# Patient Record
Sex: FEMALE | Race: AMERICAN INDIAN OR ALASKA NATIVE | NOT HISPANIC OR LATINO
[De-identification: names, ages, dates, MRNs, and addresses within clinical notes are randomized per-mention and may not be internally consistent; named-entity substitution may affect disease eponyms.]

---

## 2018-07-13 PROBLEM — Z00.00 ENCOUNTER FOR PREVENTIVE HEALTH EXAMINATION: Status: ACTIVE | Noted: 2018-07-13

## 2020-09-14 ENCOUNTER — TRANSCRIPTION ENCOUNTER (OUTPATIENT)
Age: 25
End: 2020-09-14

## 2023-02-07 ENCOUNTER — APPOINTMENT (OUTPATIENT)
Dept: OBGYN | Facility: CLINIC | Age: 28
End: 2023-02-07
Payer: MEDICAID

## 2023-02-07 PROCEDURE — 76817 TRANSVAGINAL US OBSTETRIC: CPT

## 2023-02-07 PROCEDURE — 36415 COLL VENOUS BLD VENIPUNCTURE: CPT

## 2023-02-07 PROCEDURE — 81025 URINE PREGNANCY TEST: CPT

## 2023-02-07 PROCEDURE — 99385 PREV VISIT NEW AGE 18-39: CPT

## 2023-02-07 PROCEDURE — 81002 URINALYSIS NONAUTO W/O SCOPE: CPT

## 2023-03-09 ENCOUNTER — APPOINTMENT (OUTPATIENT)
Dept: OBGYN | Facility: CLINIC | Age: 28
End: 2023-03-09
Payer: MEDICAID

## 2023-03-09 PROCEDURE — 36415 COLL VENOUS BLD VENIPUNCTURE: CPT

## 2023-03-09 PROCEDURE — 81002 URINALYSIS NONAUTO W/O SCOPE: CPT

## 2023-03-09 PROCEDURE — 76815 OB US LIMITED FETUS(S): CPT

## 2023-03-09 PROCEDURE — 0502F SUBSEQUENT PRENATAL CARE: CPT

## 2023-03-27 ENCOUNTER — APPOINTMENT (OUTPATIENT)
Dept: OBGYN | Facility: CLINIC | Age: 28
End: 2023-03-27
Payer: MEDICAID

## 2023-03-27 PROCEDURE — 81002 URINALYSIS NONAUTO W/O SCOPE: CPT

## 2023-03-27 PROCEDURE — 0502F SUBSEQUENT PRENATAL CARE: CPT

## 2023-05-04 ENCOUNTER — APPOINTMENT (OUTPATIENT)
Dept: OBGYN | Facility: CLINIC | Age: 28
End: 2023-05-04
Payer: MEDICAID

## 2023-05-04 PROCEDURE — 81002 URINALYSIS NONAUTO W/O SCOPE: CPT

## 2023-05-04 PROCEDURE — 0502F SUBSEQUENT PRENATAL CARE: CPT

## 2023-06-08 ENCOUNTER — APPOINTMENT (OUTPATIENT)
Dept: OBGYN | Facility: CLINIC | Age: 28
End: 2023-06-08
Payer: MEDICAID

## 2023-06-08 PROCEDURE — 81002 URINALYSIS NONAUTO W/O SCOPE: CPT

## 2023-06-08 PROCEDURE — 0502F SUBSEQUENT PRENATAL CARE: CPT

## 2023-06-08 PROCEDURE — 76815 OB US LIMITED FETUS(S): CPT

## 2023-06-08 PROCEDURE — 36415 COLL VENOUS BLD VENIPUNCTURE: CPT

## 2023-07-25 ENCOUNTER — APPOINTMENT (OUTPATIENT)
Dept: OBGYN | Facility: CLINIC | Age: 28
End: 2023-07-25
Payer: MEDICAID

## 2023-07-25 PROCEDURE — 81002 URINALYSIS NONAUTO W/O SCOPE: CPT

## 2023-07-25 PROCEDURE — 76815 OB US LIMITED FETUS(S): CPT

## 2023-07-25 PROCEDURE — 0502F SUBSEQUENT PRENATAL CARE: CPT

## 2023-08-15 ENCOUNTER — APPOINTMENT (OUTPATIENT)
Dept: OBGYN | Facility: CLINIC | Age: 28
End: 2023-08-15
Payer: MEDICAID

## 2023-08-15 PROCEDURE — 76819 FETAL BIOPHYS PROFIL W/O NST: CPT

## 2023-08-15 PROCEDURE — 81002 URINALYSIS NONAUTO W/O SCOPE: CPT

## 2023-08-15 PROCEDURE — 0502F SUBSEQUENT PRENATAL CARE: CPT

## 2023-08-15 PROCEDURE — 76820 UMBILICAL ARTERY ECHO: CPT

## 2023-08-15 PROCEDURE — 76805 OB US >/= 14 WKS SNGL FETUS: CPT

## 2023-08-24 ENCOUNTER — APPOINTMENT (OUTPATIENT)
Dept: OBGYN | Facility: CLINIC | Age: 28
End: 2023-08-24
Payer: MEDICAID

## 2023-08-24 PROCEDURE — 0502F SUBSEQUENT PRENATAL CARE: CPT

## 2023-08-24 PROCEDURE — 81002 URINALYSIS NONAUTO W/O SCOPE: CPT

## 2023-08-27 ENCOUNTER — INPATIENT (INPATIENT)
Facility: HOSPITAL | Age: 28
LOS: 1 days | Discharge: HOME | End: 2023-08-29
Attending: OBSTETRICS & GYNECOLOGY | Admitting: OBSTETRICS & GYNECOLOGY
Payer: MEDICAID

## 2023-08-27 ENCOUNTER — TRANSCRIPTION ENCOUNTER (OUTPATIENT)
Age: 28
End: 2023-08-27

## 2023-08-27 VITALS
RESPIRATION RATE: 16 BRPM | TEMPERATURE: 97 F | DIASTOLIC BLOOD PRESSURE: 64 MMHG | HEART RATE: 70 BPM | SYSTOLIC BLOOD PRESSURE: 118 MMHG

## 2023-08-27 DIAGNOSIS — O26.899 OTHER SPECIFIED PREGNANCY RELATED CONDITIONS, UNSPECIFIED TRIMESTER: ICD-10-CM

## 2023-08-27 LAB
BLD GP AB SCN SERPL QL: NEGATIVE — SIGNIFICANT CHANGE UP
RH IG SCN BLD-IMP: POSITIVE — SIGNIFICANT CHANGE UP
RH IG SCN BLD-IMP: POSITIVE — SIGNIFICANT CHANGE UP

## 2023-08-27 PROCEDURE — 59400 OBSTETRICAL CARE: CPT | Mod: U9

## 2023-08-27 PROCEDURE — 76815 OB US LIMITED FETUS(S): CPT | Mod: 26

## 2023-08-27 PROCEDURE — 59025 FETAL NON-STRESS TEST: CPT | Mod: 26

## 2023-08-27 PROCEDURE — 99223 1ST HOSP IP/OBS HIGH 75: CPT | Mod: 25

## 2023-08-27 RX ORDER — DIPHENHYDRAMINE HCL 50 MG
25 CAPSULE ORAL EVERY 6 HOURS
Refills: 0 | Status: DISCONTINUED | OUTPATIENT
Start: 2023-08-27 | End: 2023-08-29

## 2023-08-27 RX ORDER — AER TRAVELER 0.5 G/1
1 SOLUTION RECTAL; TOPICAL EVERY 4 HOURS
Refills: 0 | Status: DISCONTINUED | OUTPATIENT
Start: 2023-08-27 | End: 2023-08-29

## 2023-08-27 RX ORDER — PRAMOXINE HYDROCHLORIDE 150 MG/15G
1 AEROSOL, FOAM RECTAL EVERY 4 HOURS
Refills: 0 | Status: DISCONTINUED | OUTPATIENT
Start: 2023-08-27 | End: 2023-08-29

## 2023-08-27 RX ORDER — SODIUM CHLORIDE 9 MG/ML
1000 INJECTION, SOLUTION INTRAVENOUS
Refills: 0 | Status: DISCONTINUED | OUTPATIENT
Start: 2023-08-27 | End: 2023-08-28

## 2023-08-27 RX ORDER — SODIUM CHLORIDE 9 MG/ML
1000 INJECTION, SOLUTION INTRAVENOUS ONCE
Refills: 0 | Status: DISCONTINUED | OUTPATIENT
Start: 2023-08-27 | End: 2023-08-28

## 2023-08-27 RX ORDER — ACETAMINOPHEN 500 MG
975 TABLET ORAL
Refills: 0 | Status: DISCONTINUED | OUTPATIENT
Start: 2023-08-27 | End: 2023-08-29

## 2023-08-27 RX ORDER — DIBUCAINE 1 %
1 OINTMENT (GRAM) RECTAL EVERY 6 HOURS
Refills: 0 | Status: DISCONTINUED | OUTPATIENT
Start: 2023-08-27 | End: 2023-08-29

## 2023-08-27 RX ORDER — LANOLIN
1 OINTMENT (GRAM) TOPICAL EVERY 6 HOURS
Refills: 0 | Status: DISCONTINUED | OUTPATIENT
Start: 2023-08-27 | End: 2023-08-29

## 2023-08-27 RX ORDER — TETANUS TOXOID, REDUCED DIPHTHERIA TOXOID AND ACELLULAR PERTUSSIS VACCINE, ADSORBED 5; 2.5; 8; 8; 2.5 [IU]/.5ML; [IU]/.5ML; UG/.5ML; UG/.5ML; UG/.5ML
0.5 SUSPENSION INTRAMUSCULAR ONCE
Refills: 0 | Status: DISCONTINUED | OUTPATIENT
Start: 2023-08-27 | End: 2023-08-29

## 2023-08-27 RX ORDER — OXYCODONE HYDROCHLORIDE 5 MG/1
5 TABLET ORAL
Refills: 0 | Status: DISCONTINUED | OUTPATIENT
Start: 2023-08-27 | End: 2023-08-29

## 2023-08-27 RX ORDER — IBUPROFEN 200 MG
600 TABLET ORAL EVERY 6 HOURS
Refills: 0 | Status: COMPLETED | OUTPATIENT
Start: 2023-08-27 | End: 2024-07-25

## 2023-08-27 RX ORDER — OXYCODONE HYDROCHLORIDE 5 MG/1
5 TABLET ORAL ONCE
Refills: 0 | Status: DISCONTINUED | OUTPATIENT
Start: 2023-08-27 | End: 2023-08-29

## 2023-08-27 RX ORDER — MAGNESIUM HYDROXIDE 400 MG/1
30 TABLET, CHEWABLE ORAL
Refills: 0 | Status: DISCONTINUED | OUTPATIENT
Start: 2023-08-27 | End: 2023-08-29

## 2023-08-27 RX ORDER — KETOROLAC TROMETHAMINE 30 MG/ML
30 SYRINGE (ML) INJECTION ONCE
Refills: 0 | Status: DISCONTINUED | OUTPATIENT
Start: 2023-08-27 | End: 2023-08-27

## 2023-08-27 RX ORDER — CHLORHEXIDINE GLUCONATE 213 G/1000ML
1 SOLUTION TOPICAL DAILY
Refills: 0 | Status: DISCONTINUED | OUTPATIENT
Start: 2023-08-27 | End: 2023-08-28

## 2023-08-27 RX ORDER — SIMETHICONE 80 MG/1
80 TABLET, CHEWABLE ORAL EVERY 4 HOURS
Refills: 0 | Status: DISCONTINUED | OUTPATIENT
Start: 2023-08-27 | End: 2023-08-29

## 2023-08-27 RX ORDER — OXYTOCIN 10 UNIT/ML
333.33 VIAL (ML) INJECTION
Qty: 20 | Refills: 0 | Status: DISCONTINUED | OUTPATIENT
Start: 2023-08-27 | End: 2023-08-28

## 2023-08-27 RX ORDER — HYDROCORTISONE 1 %
1 OINTMENT (GRAM) TOPICAL EVERY 6 HOURS
Refills: 0 | Status: DISCONTINUED | OUTPATIENT
Start: 2023-08-27 | End: 2023-08-29

## 2023-08-27 RX ORDER — OXYTOCIN 10 UNIT/ML
41.67 VIAL (ML) INJECTION
Qty: 20 | Refills: 0 | Status: DISCONTINUED | OUTPATIENT
Start: 2023-08-27 | End: 2023-08-29

## 2023-08-27 RX ORDER — BENZOCAINE 10 %
1 GEL (GRAM) MUCOUS MEMBRANE EVERY 6 HOURS
Refills: 0 | Status: DISCONTINUED | OUTPATIENT
Start: 2023-08-27 | End: 2023-08-29

## 2023-08-27 RX ORDER — SODIUM CHLORIDE 9 MG/ML
3 INJECTION INTRAMUSCULAR; INTRAVENOUS; SUBCUTANEOUS EVERY 8 HOURS
Refills: 0 | Status: DISCONTINUED | OUTPATIENT
Start: 2023-08-27 | End: 2023-08-29

## 2023-08-27 RX ADMIN — Medication 30 MILLIGRAM(S): at 23:52

## 2023-08-27 RX ADMIN — Medication 1000 MILLIUNIT(S)/MIN: at 23:52

## 2023-08-27 RX ADMIN — Medication 30 MILLIGRAM(S): at 23:01

## 2023-08-27 RX ADMIN — Medication 30 MILLIGRAM(S): at 22:00

## 2023-08-27 NOTE — DISCHARGE NOTE OB - PLAN OF CARE
follow up 6 weeks Seen by hematologist which recommends lovenox 40mg daily X 6 weeks.  Patient declined taking lovenox.  Risks discussed including VTE, stroke & death.  Patient to follow-up with private hematologist in [place] for further management. After discharge, please stay on pelvic rest for 6 weeks, meaning no sexual intercourse, no tampons and no douching.  No driving for 2 weeks.  No lifting objects heavier than baby for 2 weeks.  Expect to have vaginal bleeding/spotting for up to six weeks.  The bleeding should get lighter and more white/light brown with time.  For bleeding soaking more than a pad an hour or passing clots greater than the size of your fist, come in to the   emergency department.  Follow up in the office in 6 weeks

## 2023-08-27 NOTE — OB PROVIDER TRIAGE NOTE - NSHPPHYSICALEXAM_GEN_ALL_CORE
Vital Signs Last 24 Hrs  T(C): 36.3 (27 Aug 2023 19:54), Max: 36.3 (27 Aug 2023 19:52)  T(F): 97.34 (27 Aug 2023 19:54), Max: 97.34 (27 Aug 2023 19:54)  HR: 64 (27 Aug 2023 19:57) (64 - 70)  BP: 109/64 (27 Aug 2023 19:57) (109/64 - 118/64)  BP(mean): --  RR: 16 (27 Aug 2023 19:52) (16 - 16)  SpO2: --    Gen: A/O x3  Abd: Gravid uterus, toco in place   TAS: vertex, anterior placenta, +FHR, images saved in [facility]  FHR: 130 baseline, moderate variability, with accelerations, no decelerations, reactive  CTX: regular, q2-7 min  SVE: 6.5/70/-2  EFW: 3005 on 8/24

## 2023-08-27 NOTE — OB PROVIDER H&P - LABOR: CERVICAL DILATION
Adriana Briggs TRANSFER - OUT REPORT:    Verbal report given to Beatris(name) on Bea Horn  being transferred to MRI(unit) for routine progression of care       Report consisted of patients Situation, Background, Assessment and   Recommendations(SBAR). Information from the following report(s) Intake/Output was reviewed with the receiving nurse. Lines:   Peripheral IV 11/21/21 Left; Posterior Hand (Active)   Site Assessment Clean, dry, & intact 11/23/21 0640   Phlebitis Assessment 0 11/23/21 0640   Infiltration Assessment 0 11/23/21 0640   Dressing Status Clean, dry, & intact 11/23/21 0640   Dressing Type Transparent; Tape 11/23/21 0640   Hub Color/Line Status Blue; Flushed; Patent; Capped 11/23/21 0640   Action Taken Open ports on tubing capped 11/23/21 0640   Alcohol Cap Used Yes 11/23/21 0640        Opportunity for questions and clarification was provided.       Patient transported with:   Registered Nurse Greater than or equal to 4 cm

## 2023-08-27 NOTE — DISCHARGE NOTE OB - PATIENT PORTAL LINK FT
You can access the FollowMyHealth Patient Portal offered by [facility] by registering at the following website: [email or web address]. By joining "[employer]"’s FollowMyHealth portal, you will also be able to view your health information using other applications (apps) compatible with our system.

## 2023-08-27 NOTE — DISCHARGE NOTE OB - CARE PLAN
1 Principal Discharge DX:	Normal vaginal delivery  Assessment and plan of treatment:	follow up 6 weeks   Principal Discharge DX:	Normal vaginal delivery  Assessment and plan of treatment:	After discharge, please stay on pelvic rest for 6 weeks, meaning no sexual intercourse, no tampons and no douching.  No driving for 2 weeks.  No lifting objects heavier than baby for 2 weeks.  Expect to have vaginal bleeding/spotting for up to six weeks.  The bleeding should get lighter and more white/light brown with time.  For bleeding soaking more than a pad an hour or passing clots greater than the size of your fist, come in to the   emergency department.  Follow up in the office in 6 weeks  Secondary Diagnosis:	Heterozygous factor V Leiden mutation  Assessment and plan of treatment:	Seen by hematologist which recommends lovenox 40mg daily X 6 weeks.  Patient declined taking lovenox.  Risks discussed including VTE, stroke & death.  Patient to follow-up with private hematologist in [place] for further management.

## 2023-08-27 NOTE — OB RN DELIVERY SUMMARY - NS_SEPSISRSKCALC_OBGYN_ALL_OB_FT
EOS calculated successfully. EOS Risk Factor: 0.5/1000 live births ([facility] national incidence); GA=40w1d; Temp=97.34; ROM=0.133; GBS='Negative'; Antibiotics='No antibiotics or any antibiotics < 2 hrs prior to birth'

## 2023-08-27 NOTE — DISCHARGE NOTE OB - MEDICATION SUMMARY - MEDICATIONS TO TAKE
I will START or STAY ON the medications listed below when I get home from the hospital:  None I will START or STAY ON the medications listed below when I get home from the hospital:    ibuprofen 600 mg oral tablet  -- 1 tab(s) by mouth every 6 hours as needed for  moderate pain  -- Indication: For Moderate pain    acetaminophen 325 mg oral tablet  -- 3 tab(s) by mouth every 6 hours as needed for  mild pain  -- Indication: For Mild pain    Prenatal Multivitamins with Folic Acid 1 mg oral tablet  -- 1 tab(s) by mouth once a day  -- Indication: For vitamins

## 2023-08-27 NOTE — OB PROVIDER H&P - NSLOWPPHRISK_OBGYN_A_OB
No previous uterine incision/[place] Pregnancy/Less than or equal to 4 previous vaginal births/No history of postpartum hemorrhage

## 2023-08-27 NOTE — OB PROVIDER TRIAGE NOTE - LABOR: CERVICAL POSITION
Reminder appt call     Patient is scheduled on 3/17/2022 @ 10 am with an arrival time  945 am in the [place] location with Dr See Celeste       Left message on voicemail
middle

## 2023-08-27 NOTE — OB RN PATIENT PROFILE - FALL HARM RISK - UNIVERSAL INTERVENTIONS
Bed in lowest position, wheels locked, appropriate side rails in place/Call bell, personal items and telephone in reach/Instruct patient to call for assistance before getting out of bed or chair/Non-slip footwear when patient is out of bed/[place] to call system/Physically safe environment - no spills, clutter or unnecessary equipment/Purposeful Proactive Rounding/Room/bathroom lighting operational, light cord in reach

## 2023-08-27 NOTE — OB PROVIDER TRIAGE NOTE - HISTORY OF PRESENT ILLNESS
27 y/o , EDC 23, GA 40.1 weeks, presents for contractions since 3:00 AM, every 7 minutes, 8/10 in intensity on numeric pain scale. Pt does not desire epidural at this time. Denies LOf/ VB. Reports good FM    Antepartum Course: Uncomplicated per pt  GBS: Negative   Med Hx:  -Factor V Leiden, MTFHR, does not see hematologist  -2 leaking heart valves, reports "it is 1 and I was told it is only concerning if it reaches 4"  Allergies: Metal-hives

## 2023-08-27 NOTE — OB RN DELIVERY SUMMARY - NSSELHIDDEN_OBGYN_ALL_OB_FT
[[email or web address]_DeliveryAttending1_OBGYN_ALL_OB_FT:[id number]],[NS_DeliveryRN_OBGYN_ALL_OB_FT:[id number]]

## 2023-08-27 NOTE — OB RN TRIAGE NOTE - NSICDXPASTMEDICALHX_GEN_ALL_CORE_FT
PAST MEDICAL HISTORY:  Factor 5 Leiden mutation, heterozygous     MTHFR (methylene THF reductase) deficiency and homocystinuria

## 2023-08-27 NOTE — OB PROVIDER H&P - PROBLEM SELECTOR PLAN 1
29 y/o , EDC 23, GA 40.1 weeks, evidence of active labor.  -Admit to Labor and Delivery  -Admission Consents obtained  -GBS Negative  -Expectant management    -discussed with Dr. Calabrese

## 2023-08-27 NOTE — OB PROVIDER TRIAGE NOTE - NSOBPROVIDERNOTE_OBGYN_ALL_OB_FT
27 y/o , EDC 23, GA 40.1 weeks, evidence of active labor.  -Admit to Labor and Delivery  -Admission Consents obtained  -GBS Negative  -Expectant management    -discussed with Dr. Calabrese

## 2023-08-27 NOTE — DISCHARGE NOTE OB - CARE PROVIDER_API CALL
Lincoln Tolliver  Obstetrics and Gynecology  [address], [address]  [place] [address]  Phone: [phone]  Fax: [phone]  Follow Up Time:

## 2023-08-28 LAB
HCT VFR BLD CALC: 34.1 % — LOW (ref 34.5–45)
HGB BLD-MCNC: 11.3 G/DL — LOW (ref 11.5–15.5)
T PALLIDUM AB TITR SER: NEGATIVE — SIGNIFICANT CHANGE UP

## 2023-08-28 PROCEDURE — 99254 IP/OBS CNSLTJ NEW/EST MOD 60: CPT

## 2023-08-28 RX ORDER — ASPIRIN/CALCIUM CARB/MAGNESIUM 324 MG
1 TABLET ORAL
Refills: 0 | DISCHARGE

## 2023-08-28 RX ORDER — FOLIC ACID 0.8 MG
0 TABLET ORAL
Refills: 0 | DISCHARGE

## 2023-08-28 RX ORDER — IBUPROFEN 200 MG
600 TABLET ORAL EVERY 6 HOURS
Refills: 0 | Status: DISCONTINUED | OUTPATIENT
Start: 2023-08-28 | End: 2023-08-29

## 2023-08-28 RX ADMIN — Medication 600 MILLIGRAM(S): at 05:41

## 2023-08-28 RX ADMIN — Medication 1 TABLET(S): at 12:01

## 2023-08-28 RX ADMIN — Medication 975 MILLIGRAM(S): at 00:56

## 2023-08-28 RX ADMIN — Medication 975 MILLIGRAM(S): at 09:15

## 2023-08-28 RX ADMIN — Medication 600 MILLIGRAM(S): at 17:32

## 2023-08-28 RX ADMIN — Medication 975 MILLIGRAM(S): at 01:15

## 2023-08-28 RX ADMIN — Medication 975 MILLIGRAM(S): at 08:50

## 2023-08-28 RX ADMIN — Medication 600 MILLIGRAM(S): at 17:16

## 2023-08-28 RX ADMIN — Medication 600 MILLIGRAM(S): at 12:00

## 2023-08-28 RX ADMIN — Medication 600 MILLIGRAM(S): at 12:30

## 2023-08-28 RX ADMIN — Medication 600 MILLIGRAM(S): at 06:28

## 2023-08-28 NOTE — CONSULT NOTE ADULT - SUBJECTIVE AND OBJECTIVE BOX
HEMATOLOGY ONCOLOGY CONSULT     Patient is a 28y old  Female who presents with a chief complaint of Admitted in  labor (27 Aug 2023 21:59)      HPI:  29 y/o , EDC 23, GA 40.1 weeks, presents for contractions since 3:00 AM, every 7 minutes, 8/10 in intensity on numeric pain scale. Pt does not desire epidural at this time. Denies LOf/ VB. Reports good FM    Antepartum Course: Uncomplicated per pt  GBS: Negative   Med Hx:  -Factor V Leiden, MTFHR, does not see hematologist  -2 leaking heart valves, reports "it is 1 and I was told it is only concerning if it reaches 4"  Allergies: Metal-hives (27 Aug 2023 20:46)       ROS:  Negative except for:    PAST MEDICAL & SURGICAL HISTORY:  Factor 5 Leiden mutation, heterozygous      MTHFR (methylene THF reductase) deficiency and homocystinuria      No significant past surgical history          SOCIAL HISTORY:    FAMILY HISTORY:      MEDICATIONS  (STANDING):  acetaminophen     Tablet .. 975 milliGRAM(s) Oral <User Schedule>  diphtheria/tetanus/pertussis (acellular) Vaccine (Adacel) 0.5 milliLiter(s) IntraMuscular once  ibuprofen  Tablet. 600 milliGRAM(s) Oral every 6 hours  oxytocin Infusion 41.667 milliUNIT(s)/Min (125 mL/Hr) IV Continuous <Continuous>  prenatal multivitamin 1 Tablet(s) Oral daily  sodium chloride 0.9% lock flush 3 milliLiter(s) IV Push every 8 hours    MEDICATIONS  (PRN):  benzocaine 20%/menthol 0.5% Spray 1 Spray(s) Topical every 6 hours PRN for Perineal discomfort  dibucaine 1% Ointment 1 Application(s) Topical every 6 hours PRN Perineal discomfort  diphenhydrAMINE 25 milliGRAM(s) Oral every 6 hours PRN Pruritus  hydrocortisone 1% Cream 1 Application(s) Topical every 6 hours PRN Moderate Pain (4-6)  lanolin Ointment 1 Application(s) Topical every 6 hours PRN nipple soreness  magnesium hydroxide Suspension 30 milliLiter(s) Oral two times a day PRN Constipation  oxyCODONE    IR 5 milliGRAM(s) Oral every 3 hours PRN Moderate to Severe Pain (4-10)  oxyCODONE    IR 5 milliGRAM(s) Oral once PRN Moderate to Severe Pain (4-10)  pramoxine 1%/zinc 5% Cream 1 Application(s) Topical every 4 hours PRN Moderate Pain (4-6)  simethicone 80 milliGRAM(s) Chew every 4 hours PRN Gas  witch hazel Pads 1 Application(s) Topical every 4 hours PRN Perineal discomfort      Allergies    No Known Allergies    Intolerances        Vital Signs Last 24 Hrs  T(C): 36.8 (28 Aug 2023 14:07), Max: 36.8 (28 Aug 2023 14:07)  T(F): 98.2 (28 Aug 2023 14:07), Max: 98.2 (28 Aug 2023 14:07)  HR: 65 (28 Aug 2023 14:07) (45 - 79)  BP: 114/54 (28 Aug 2023 14:) (98/57 - 124/65)  BP(mean): --  RR: 18 (28 Aug 2023 14:) (16 - 18)  SpO2: 100% (28 Aug 2023 14:) (98% - 100%)    Parameters below as of 28 Aug 2023 06:53  Patient On (Oxygen Delivery Method): room air        PHYSICAL EXAM  General: adult in NAD  HEENT: clear oropharynx, anicteric sclera, pink conjunctiva  Neck: supple  CV: normal S1/S2 with no murmur rubs or gallops  Lungs: positive air movement b/l ant lungs,clear to auscultation, no wheezes, no rales  Abdomen: soft non-tender non-distended, no hepatosplenomegaly  Ext: no clubbing cyanosis or edema  Skin: no rashes and no petechiae  Neuro: alert and oriented X 4, no focal deficits      23 @ 07:01  -  23 @ 07:00  --------------------------------------------------------  IN: 1000 mL / OUT: 600 mL / NET: 400 mL      LABS:                          11.3   x     )-----------( x        ( 28 Aug 2023 06:00 )             34.1                                   BLOOD SMEAR INTERPRETATION:       RADIOLOGY & ADDITIONAL STUDIES:

## 2023-08-28 NOTE — CONSULT NOTE ADULT - ASSESSMENT
27 yo  female with heterozygous Factor V leiden (no prior history of thrombosis, this was checked based on family history of Factor V Leiden) admitted for labor. Hematology consulted for anticoagulation recommendations given history of Factor V Leiden.    #Factor V Leiden  - Pt has a hematologist in [place] that she cannot recall the name  - Given personal history of genetic mutation and negative prior history of thrombosis, patient is low risk for thrombus. Still, post-partum period has an elevated risk. Would recommend a 6 week course of Lovenox 40 mg subcutaneously daily post-partum.  - This was discussed in detail with the patient and her spouse. At this time, she wants more time to decide if she should do Lovenox.   - If patient is amenable to Lovenox, please verify insurance coverage and have Lovenox teaching prior to discharge.  - Pt should follow up outpatient with her hematologist after discharge.    Case d/w Dr. Camargo.    Tamara Aleman M.D.  Hematology and Medical Oncology Fellow  Pager: [phone]  For weekends and evenings (5 pm - 8 am), please page Heme/Onc fellow on call.

## 2023-08-28 NOTE — CONSULT NOTE ADULT - ATTENDING COMMENTS
28F  with heterozygous Factor V Leiden mutation, who was admitted for labor. Hematology consulted for anticoagulation recommendations given history of Factor V Leiden.    #Factor V Leiden: She has a known heterozygous mutation and follows with a hematologist in [place]. She does not have a personal history of thromboembolism, but her mother reportedly has a history of a VTE.   - As she is hypercoagulable particularly in the post-partum period, we would recommend 6 weeks of prophylactic lovenox (40 mg SQ daily).   - She can follow up with her hematologist on discharge

## 2023-08-28 NOTE — PROGRESS NOTE ADULT - ASSESSMENT
27y/o  PPD#1 from . PMH significant for Factor V Leiden (hetero), MTHFR deficiency, and homocystinuria. Overall, patient stable and recovering well postpartum.    #Factor V Leiden   - H/H: 11.3/34.1   - Consider hematology consult   - Patient states she saw a hematologist prior to pregnancy     #Postpartum state  - Continue with po analgesia  - Increase ambulation  - Continue regular diet  - IV lock  - No labs    Musa Call  PGY-1

## 2023-08-29 ENCOUNTER — APPOINTMENT (OUTPATIENT)
Dept: OBGYN | Facility: CLINIC | Age: 28
End: 2023-08-29

## 2023-08-29 VITALS
RESPIRATION RATE: 18 BRPM | SYSTOLIC BLOOD PRESSURE: 114 MMHG | TEMPERATURE: 98 F | DIASTOLIC BLOOD PRESSURE: 61 MMHG | OXYGEN SATURATION: 98 % | HEART RATE: 66 BPM

## 2023-08-29 RX ORDER — ACETAMINOPHEN 500 MG
3 TABLET ORAL
Qty: 0 | Refills: 0 | DISCHARGE
Start: 2023-08-29

## 2023-08-29 RX ORDER — IBUPROFEN 200 MG
1 TABLET ORAL
Qty: 0 | Refills: 0 | DISCHARGE
Start: 2023-08-29

## 2023-08-29 RX ADMIN — Medication 600 MILLIGRAM(S): at 06:41

## 2023-08-29 RX ADMIN — Medication 600 MILLIGRAM(S): at 01:15

## 2023-08-29 RX ADMIN — Medication 600 MILLIGRAM(S): at 00:34

## 2023-08-29 RX ADMIN — Medication 600 MILLIGRAM(S): at 07:15

## 2023-08-29 NOTE — PROGRESS NOTE ADULT - SUBJECTIVE AND OBJECTIVE BOX
Assessment and Plan    Day  1  Vaginal Delivery  She feels well  Continue the current pain medication  Encourage  Ambulation  Encourage regular diet   DVT ppx: SCDs only when not ambulating  She is stable, tolerates a diet and has normal flatus and bowel movements  She will be discharged on Day 2 according to the normal criteria.  
R1 PPD#1  Progress Note    29y/o  PPD#1 from . PMH significant for Factor V Leiden (hetero), MTHFR deficiency, and homocystinuria.     Patient seen and examined at bedside, no acute overnight events. No acute complaints, pain well controlled. Patient is ambulating, voiding spontaneously, passing gas, and tolerating regular diet. Denies CP, SOB, N/V, HA, blurred vision, epigastric pain. Bleeding minimal.    Vital Signs Last 24 Hours  T(C): 36.4 (23 @ 10:07), Max: 36.7 (23 @ 02:02)  HR: 63 (23 @ 10:07) (45 - 79)  BP: 107/63 (23 @ 10:07) (98/57 - 124/65)  RR: 18 (23 @ 10:07) (16 - 18)  SpO2: 99% (23 @ 10:07) (98% - 99%)    Physical Exam:  General: NAD  Abdomen: Soft, non-tender, non-distended, fundus firm  Pelvic: Lochia wnl    Labs:    Blood Type: B Positive  Antibody Screen: Negative               11.3   x     )-----------( x        (  @ 06:00 )             34.1         MEDICATIONS  (STANDING):  acetaminophen     Tablet .. 975 milliGRAM(s) Oral <User Schedule>  diphtheria/tetanus/pertussis (acellular) Vaccine (Adacel) 0.5 milliLiter(s) IntraMuscular once  ibuprofen  Tablet. 600 milliGRAM(s) Oral every 6 hours  oxytocin Infusion 41.667 milliUNIT(s)/Min (125 mL/Hr) IV Continuous <Continuous>  prenatal multivitamin 1 Tablet(s) Oral daily  sodium chloride 0.9% lock flush 3 milliLiter(s) IV Push every 8 hours    MEDICATIONS  (PRN):  benzocaine 20%/menthol 0.5% Spray 1 Spray(s) Topical every 6 hours PRN for Perineal discomfort  dibucaine 1% Ointment 1 Application(s) Topical every 6 hours PRN Perineal discomfort  diphenhydrAMINE 25 milliGRAM(s) Oral every 6 hours PRN Pruritus  hydrocortisone 1% Cream 1 Application(s) Topical every 6 hours PRN Moderate Pain (4-6)  lanolin Ointment 1 Application(s) Topical every 6 hours PRN nipple soreness  magnesium hydroxide Suspension 30 milliLiter(s) Oral two times a day PRN Constipation  oxyCODONE    IR 5 milliGRAM(s) Oral every 3 hours PRN Moderate to Severe Pain (4-10)  oxyCODONE    IR 5 milliGRAM(s) Oral once PRN Moderate to Severe Pain (4-10)  pramoxine 1%/zinc 5% Cream 1 Application(s) Topical every 4 hours PRN Moderate Pain (4-6)  simethicone 80 milliGRAM(s) Chew every 4 hours PRN Gas  witch hazel Pads 1 Application(s) Topical every 4 hours PRN Perineal discomfort  
Assessment and Plan    Day  2  Vaginal Delivery  She feels well  Patient refused to take lovenox   injection as per  Hematology consult  Patient understand possible risk & will follow up outpatient   Continue the current pain medication  Encourage  Ambulation  Encourage regular diet   DVT ppx: SCDs only when not ambulating  She is stable, tolerates a diet and has normal flatus and bowel movements  She will be discharged on Day 2 according to the normal criteria.  
S: 27yo PPD#2 s/p  c/w Factor V Leiden & Heterozygous MTFHR was only on aspirin during pregnancy.  Patient feels well. Pain is well controlled. She is tolerating a regular diet and passing flatus. She is voiding spontaneously, and ambulating without difficulty. Denies CP/SOB. Denies lightheadedness/dizziness. Denies N/V.    O:  Vitals:   Vital Signs Last 24 Hrs  T(C): 36.7 (29 Aug 2023 06:00), Max: 36.8 (28 Aug 2023 14:07)  T(F): 98 (29 Aug 2023 06:00), Max: 98.2 (28 Aug 2023 14:07)  HR: 56 (29 Aug 2023 06:00) (56 - 65)  BP: 107/50 (29 Aug 2023 06:00) (107/50 - 121/59)  BP(mean): --  RR: 17 (29 Aug 2023 06:00) (17 - 18)  SpO2: 98% (29 Aug 2023 06:00) (98% - 100%)    Parameters below as of 29 Aug 2023 06:00  Patient On (Oxygen Delivery Method): room air        MEDICATIONS  (STANDING):  acetaminophen     Tablet .. 975 milliGRAM(s) Oral <User Schedule>  diphtheria/tetanus/pertussis (acellular) Vaccine (Adacel) 0.5 milliLiter(s) IntraMuscular once  ibuprofen  Tablet. 600 milliGRAM(s) Oral every 6 hours  oxytocin Infusion 41.667 milliUNIT(s)/Min (125 mL/Hr) IV Continuous <Continuous>  prenatal multivitamin 1 Tablet(s) Oral daily  sodium chloride 0.9% lock flush 3 milliLiter(s) IV Push every 8 hours    MEDICATIONS  (PRN):  benzocaine 20%/menthol 0.5% Spray 1 Spray(s) Topical every 6 hours PRN for Perineal discomfort  dibucaine 1% Ointment 1 Application(s) Topical every 6 hours PRN Perineal discomfort  diphenhydrAMINE 25 milliGRAM(s) Oral every 6 hours PRN Pruritus  hydrocortisone 1% Cream 1 Application(s) Topical every 6 hours PRN Moderate Pain (4-6)  lanolin Ointment 1 Application(s) Topical every 6 hours PRN nipple soreness  magnesium hydroxide Suspension 30 milliLiter(s) Oral two times a day PRN Constipation  oxyCODONE    IR 5 milliGRAM(s) Oral every 3 hours PRN Moderate to Severe Pain (4-10)  oxyCODONE    IR 5 milliGRAM(s) Oral once PRN Moderate to Severe Pain (4-10)  pramoxine 1%/zinc 5% Cream 1 Application(s) Topical every 4 hours PRN Moderate Pain (4-6)  simethicone 80 milliGRAM(s) Chew every 4 hours PRN Gas  witch hazel Pads 1 Application(s) Topical every 4 hours PRN Perineal discomfort      Labs:  Blood type: B Positive  Rubella IgG: RPR: Negative                          11.3<L>   -- >-----------< --    ( 08 @ 06:00 )             34.1<L>      Physical Exam:  General: NAD  Abdomen: soft, non-tender, non-distended, fundus firm  Vaginal: Lochia wnl  Extremities: No erythema/edema    A/P: 27yo PPD#2 s/p .  Patient is stable and doing well post-partum.      #Factor V Leiden & MTFHR  -Seen by heme in-patient with recommendations for patient to be on lovenox 40mg daily X 6 weeks due to hypercoagulable state in postpartum.  Patient declined being placed on lovenox.  Risk discussed with patient with primary RN present regarding risks of VTE which could result in stroke and death.  Patient understands risk and will followup with private heme in [place] (patient does not recall name).    #Postpartum  - Pain well controlled, continue current pain regimen  - Increase ambulation, SCDs when not ambulating  - Continue regular diet  - Discharge planned for today    Nellie BAUER

## 2023-08-31 PROBLEM — D68.51 ACTIVATED PROTEIN C RESISTANCE: Chronic | Status: ACTIVE | Noted: 2023-08-27

## 2023-08-31 PROBLEM — E72.12 METHYLENETETRAHYDROFOLATE REDUCTASE DEFICIENCY: Chronic | Status: ACTIVE | Noted: 2023-08-27

## 2023-10-10 ENCOUNTER — APPOINTMENT (OUTPATIENT)
Dept: OBGYN | Facility: CLINIC | Age: 28
End: 2023-10-10
Payer: MEDICAID

## 2023-10-10 PROCEDURE — 81002 URINALYSIS NONAUTO W/O SCOPE: CPT

## 2023-10-10 PROCEDURE — 76830 TRANSVAGINAL US NON-OB: CPT

## 2023-11-29 NOTE — DISCHARGE NOTE OB - CLICK TO LAUNCH ORM
11/29/23 0701   Incentive Spirometry Treatment   Incentive Spirometer Volume 1000 mL   Chest Physiotherapy Treatment   $ PEP/CPT Performed PEP / Flutter        .